# Patient Record
Sex: FEMALE | Race: OTHER | HISPANIC OR LATINO | URBAN - METROPOLITAN AREA
[De-identification: names, ages, dates, MRNs, and addresses within clinical notes are randomized per-mention and may not be internally consistent; named-entity substitution may affect disease eponyms.]

---

## 2016-06-09 RX ORDER — MECLIZINE HCL 12.5 MG
1 TABLET ORAL
Qty: 15 | Refills: 0 | OUTPATIENT
Start: 2016-06-09 | End: 2017-06-28

## 2017-06-23 ENCOUNTER — INPATIENT (INPATIENT)
Facility: HOSPITAL | Age: 67
LOS: 1 days | Discharge: HOME | DRG: 149 | End: 2017-06-25
Attending: FAMILY MEDICINE | Admitting: FAMILY MEDICINE
Payer: SELF-PAY

## 2017-06-23 VITALS
DIASTOLIC BLOOD PRESSURE: 91 MMHG | TEMPERATURE: 98 F | HEART RATE: 105 BPM | WEIGHT: 125 LBS | RESPIRATION RATE: 16 BRPM | HEIGHT: 59 IN | OXYGEN SATURATION: 95 % | SYSTOLIC BLOOD PRESSURE: 169 MMHG

## 2017-06-23 DIAGNOSIS — R42 DIZZINESS AND GIDDINESS: ICD-10-CM

## 2017-06-23 LAB
ALBUMIN SERPL ELPH-MCNC: 3.6 G/DL — SIGNIFICANT CHANGE UP (ref 3.5–5)
ALP SERPL-CCNC: 115 U/L — SIGNIFICANT CHANGE UP (ref 40–120)
ALT FLD-CCNC: 21 U/L DA — SIGNIFICANT CHANGE UP (ref 10–60)
ANION GAP SERPL CALC-SCNC: 7 MMOL/L — SIGNIFICANT CHANGE UP (ref 5–17)
AST SERPL-CCNC: 11 U/L — SIGNIFICANT CHANGE UP (ref 10–40)
BASOPHILS # BLD AUTO: 0.1 K/UL — SIGNIFICANT CHANGE UP (ref 0–0.2)
BASOPHILS NFR BLD AUTO: 1 % — SIGNIFICANT CHANGE UP (ref 0–2)
BILIRUB SERPL-MCNC: 0.4 MG/DL — SIGNIFICANT CHANGE UP (ref 0.2–1.2)
BUN SERPL-MCNC: 15 MG/DL — SIGNIFICANT CHANGE UP (ref 7–18)
CALCIUM SERPL-MCNC: 8.9 MG/DL — SIGNIFICANT CHANGE UP (ref 8.4–10.5)
CHLORIDE SERPL-SCNC: 106 MMOL/L — SIGNIFICANT CHANGE UP (ref 96–108)
CO2 SERPL-SCNC: 26 MMOL/L — SIGNIFICANT CHANGE UP (ref 22–31)
CREAT SERPL-MCNC: 0.96 MG/DL — SIGNIFICANT CHANGE UP (ref 0.5–1.3)
EOSINOPHIL # BLD AUTO: 0.1 K/UL — SIGNIFICANT CHANGE UP (ref 0–0.5)
EOSINOPHIL NFR BLD AUTO: 0.7 % — SIGNIFICANT CHANGE UP (ref 0–6)
GLUCOSE SERPL-MCNC: 192 MG/DL — HIGH (ref 70–99)
HCT VFR BLD CALC: 41.6 % — SIGNIFICANT CHANGE UP (ref 34.5–45)
HGB BLD-MCNC: 14.4 G/DL — SIGNIFICANT CHANGE UP (ref 11.5–15.5)
LYMPHOCYTES # BLD AUTO: 2.3 K/UL — SIGNIFICANT CHANGE UP (ref 1–3.3)
LYMPHOCYTES # BLD AUTO: 21.2 % — SIGNIFICANT CHANGE UP (ref 13–44)
MCHC RBC-ENTMCNC: 33.5 PG — SIGNIFICANT CHANGE UP (ref 27–34)
MCHC RBC-ENTMCNC: 34.5 GM/DL — SIGNIFICANT CHANGE UP (ref 32–36)
MCV RBC AUTO: 97.1 FL — SIGNIFICANT CHANGE UP (ref 80–100)
MONOCYTES # BLD AUTO: 0.6 K/UL — SIGNIFICANT CHANGE UP (ref 0–0.9)
MONOCYTES NFR BLD AUTO: 5.4 % — SIGNIFICANT CHANGE UP (ref 2–14)
NEUTROPHILS # BLD AUTO: 7.7 K/UL — HIGH (ref 1.8–7.4)
NEUTROPHILS NFR BLD AUTO: 71.6 % — SIGNIFICANT CHANGE UP (ref 43–77)
PLATELET # BLD AUTO: 277 K/UL — SIGNIFICANT CHANGE UP (ref 150–400)
POTASSIUM SERPL-MCNC: 3.7 MMOL/L — SIGNIFICANT CHANGE UP (ref 3.5–5.3)
POTASSIUM SERPL-SCNC: 3.7 MMOL/L — SIGNIFICANT CHANGE UP (ref 3.5–5.3)
PROT SERPL-MCNC: 8 G/DL — SIGNIFICANT CHANGE UP (ref 6–8.3)
RBC # BLD: 4.28 M/UL — SIGNIFICANT CHANGE UP (ref 3.8–5.2)
RBC # FLD: 11.3 % — SIGNIFICANT CHANGE UP (ref 10.3–14.5)
SODIUM SERPL-SCNC: 139 MMOL/L — SIGNIFICANT CHANGE UP (ref 135–145)
WBC # BLD: 10.8 K/UL — HIGH (ref 3.8–10.5)
WBC # FLD AUTO: 10.8 K/UL — HIGH (ref 3.8–10.5)

## 2017-06-23 PROCEDURE — 70450 CT HEAD/BRAIN W/O DYE: CPT | Mod: 26

## 2017-06-23 PROCEDURE — 99285 EMERGENCY DEPT VISIT HI MDM: CPT

## 2017-06-23 RX ORDER — MECLIZINE HCL 12.5 MG
25 TABLET ORAL ONCE
Qty: 0 | Refills: 0 | Status: COMPLETED | OUTPATIENT
Start: 2017-06-23 | End: 2017-06-23

## 2017-06-23 RX ORDER — IBUPROFEN 200 MG
400 TABLET ORAL ONCE
Qty: 0 | Refills: 0 | Status: COMPLETED | OUTPATIENT
Start: 2017-06-23 | End: 2017-06-23

## 2017-06-23 RX ADMIN — Medication 400 MILLIGRAM(S): at 22:32

## 2017-06-23 RX ADMIN — Medication 25 MILLIGRAM(S): at 15:56

## 2017-06-23 RX ADMIN — Medication 400 MILLIGRAM(S): at 18:49

## 2017-06-23 NOTE — ED PROVIDER NOTE - PROGRESS NOTE DETAILS
JUD: Pt was signed out to me pending CT and labs. Worry for cerebellar atrophy leading to worksening ataxia. Pt cannot ambulate. CT neg, labs urnemarkable. Will admit to Dr Roque, will need further workup inpt.

## 2017-06-23 NOTE — ED ADULT NURSE NOTE - OBJECTIVE STATEMENT
Presented to ED complaining of "dizziness for 2 weeks and headache." AA&Ox3. Breathing on room air. Denies LOC.

## 2017-06-23 NOTE — ED PROVIDER NOTE - OBJECTIVE STATEMENT
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  walks with cane at baseline, pt has been complaining of waxing and waning "dizziness'  now worse over past few weeks.  has an apt with neurologist this week  pt's daughter does not think pat can go home.  she is worried that the symptoms are getting worse

## 2017-06-23 NOTE — ED ADULT NURSE REASSESSMENT NOTE - NS ED NURSE REASSESS COMMENT FT1
received pt awake alert oriented x 3 not in distress,with saline lock intact no redness no swelling noted,waiting for dispo.

## 2017-06-24 DIAGNOSIS — Z29.9 ENCOUNTER FOR PROPHYLACTIC MEASURES, UNSPECIFIED: ICD-10-CM

## 2017-06-24 DIAGNOSIS — R42 DIZZINESS AND GIDDINESS: ICD-10-CM

## 2017-06-24 DIAGNOSIS — I10 ESSENTIAL (PRIMARY) HYPERTENSION: ICD-10-CM

## 2017-06-24 LAB
ALBUMIN SERPL ELPH-MCNC: 3.1 G/DL — LOW (ref 3.5–5)
ALP SERPL-CCNC: 99 U/L — SIGNIFICANT CHANGE UP (ref 40–120)
ALT FLD-CCNC: 19 U/L DA — SIGNIFICANT CHANGE UP (ref 10–60)
ANION GAP SERPL CALC-SCNC: 10 MMOL/L — SIGNIFICANT CHANGE UP (ref 5–17)
AST SERPL-CCNC: 11 U/L — SIGNIFICANT CHANGE UP (ref 10–40)
BASOPHILS # BLD AUTO: 0.1 K/UL — SIGNIFICANT CHANGE UP (ref 0–0.2)
BASOPHILS NFR BLD AUTO: 1.5 % — SIGNIFICANT CHANGE UP (ref 0–2)
BILIRUB SERPL-MCNC: 0.4 MG/DL — SIGNIFICANT CHANGE UP (ref 0.2–1.2)
BUN SERPL-MCNC: 14 MG/DL — SIGNIFICANT CHANGE UP (ref 7–18)
CALCIUM SERPL-MCNC: 8.3 MG/DL — LOW (ref 8.4–10.5)
CHLORIDE SERPL-SCNC: 107 MMOL/L — SIGNIFICANT CHANGE UP (ref 96–108)
CHOLEST SERPL-MCNC: 198 MG/DL — SIGNIFICANT CHANGE UP (ref 10–199)
CO2 SERPL-SCNC: 25 MMOL/L — SIGNIFICANT CHANGE UP (ref 22–31)
CREAT SERPL-MCNC: 0.79 MG/DL — SIGNIFICANT CHANGE UP (ref 0.5–1.3)
EOSINOPHIL # BLD AUTO: 0.2 K/UL — SIGNIFICANT CHANGE UP (ref 0–0.5)
EOSINOPHIL NFR BLD AUTO: 2.1 % — SIGNIFICANT CHANGE UP (ref 0–6)
GLUCOSE SERPL-MCNC: 95 MG/DL — SIGNIFICANT CHANGE UP (ref 70–99)
HBA1C BLD-MCNC: 5.8 % — HIGH (ref 4–5.6)
HCT VFR BLD CALC: 38.1 % — SIGNIFICANT CHANGE UP (ref 34.5–45)
HDLC SERPL-MCNC: 44 MG/DL — SIGNIFICANT CHANGE UP (ref 40–125)
HGB BLD-MCNC: 13.2 G/DL — SIGNIFICANT CHANGE UP (ref 11.5–15.5)
LIPID PNL WITH DIRECT LDL SERPL: 131 MG/DL — SIGNIFICANT CHANGE UP
LYMPHOCYTES # BLD AUTO: 2.2 K/UL — SIGNIFICANT CHANGE UP (ref 1–3.3)
LYMPHOCYTES # BLD AUTO: 27.4 % — SIGNIFICANT CHANGE UP (ref 13–44)
MAGNESIUM SERPL-MCNC: 2.6 MG/DL — SIGNIFICANT CHANGE UP (ref 1.6–2.6)
MCHC RBC-ENTMCNC: 33.4 PG — SIGNIFICANT CHANGE UP (ref 27–34)
MCHC RBC-ENTMCNC: 34.5 GM/DL — SIGNIFICANT CHANGE UP (ref 32–36)
MCV RBC AUTO: 96.9 FL — SIGNIFICANT CHANGE UP (ref 80–100)
MONOCYTES # BLD AUTO: 0.7 K/UL — SIGNIFICANT CHANGE UP (ref 0–0.9)
MONOCYTES NFR BLD AUTO: 8.3 % — SIGNIFICANT CHANGE UP (ref 2–14)
NEUTROPHILS # BLD AUTO: 4.8 K/UL — SIGNIFICANT CHANGE UP (ref 1.8–7.4)
NEUTROPHILS NFR BLD AUTO: 60.6 % — SIGNIFICANT CHANGE UP (ref 43–77)
PHOSPHATE SERPL-MCNC: 3.9 MG/DL — SIGNIFICANT CHANGE UP (ref 2.5–4.5)
PLATELET # BLD AUTO: 248 K/UL — SIGNIFICANT CHANGE UP (ref 150–400)
POTASSIUM SERPL-MCNC: 3.7 MMOL/L — SIGNIFICANT CHANGE UP (ref 3.5–5.3)
POTASSIUM SERPL-SCNC: 3.7 MMOL/L — SIGNIFICANT CHANGE UP (ref 3.5–5.3)
PROT SERPL-MCNC: 6.9 G/DL — SIGNIFICANT CHANGE UP (ref 6–8.3)
RBC # BLD: 3.93 M/UL — SIGNIFICANT CHANGE UP (ref 3.8–5.2)
RBC # FLD: 11.9 % — SIGNIFICANT CHANGE UP (ref 10.3–14.5)
SODIUM SERPL-SCNC: 142 MMOL/L — SIGNIFICANT CHANGE UP (ref 135–145)
TOTAL CHOLESTEROL/HDL RATIO MEASUREMENT: 4.5 RATIO — SIGNIFICANT CHANGE UP (ref 3.3–7.1)
TRIGL SERPL-MCNC: 113 MG/DL — SIGNIFICANT CHANGE UP (ref 10–149)
TSH SERPL-MCNC: 2.78 UU/ML — SIGNIFICANT CHANGE UP (ref 0.34–4.82)
WBC # BLD: 7.9 K/UL — SIGNIFICANT CHANGE UP (ref 3.8–10.5)
WBC # FLD AUTO: 7.9 K/UL — SIGNIFICANT CHANGE UP (ref 3.8–10.5)

## 2017-06-24 RX ORDER — MECLIZINE HCL 12.5 MG
25 TABLET ORAL THREE TIMES A DAY
Qty: 0 | Refills: 0 | Status: DISCONTINUED | OUTPATIENT
Start: 2017-06-24 | End: 2017-06-25

## 2017-06-24 RX ORDER — AMLODIPINE BESYLATE 2.5 MG/1
5 TABLET ORAL DAILY
Qty: 0 | Refills: 0 | Status: DISCONTINUED | OUTPATIENT
Start: 2017-06-24 | End: 2017-06-25

## 2017-06-24 RX ORDER — ASPIRIN/CALCIUM CARB/MAGNESIUM 324 MG
81 TABLET ORAL DAILY
Qty: 0 | Refills: 0 | Status: DISCONTINUED | OUTPATIENT
Start: 2017-06-24 | End: 2017-06-25

## 2017-06-24 RX ORDER — SODIUM CHLORIDE 9 MG/ML
500 INJECTION INTRAMUSCULAR; INTRAVENOUS; SUBCUTANEOUS ONCE
Qty: 0 | Refills: 0 | Status: COMPLETED | OUTPATIENT
Start: 2017-06-24 | End: 2017-06-24

## 2017-06-24 RX ORDER — SODIUM CHLORIDE 9 MG/ML
1000 INJECTION INTRAMUSCULAR; INTRAVENOUS; SUBCUTANEOUS
Qty: 0 | Refills: 0 | Status: DISCONTINUED | OUTPATIENT
Start: 2017-06-24 | End: 2017-06-25

## 2017-06-24 RX ORDER — ONDANSETRON 8 MG/1
4 TABLET, FILM COATED ORAL EVERY 8 HOURS
Qty: 0 | Refills: 0 | Status: DISCONTINUED | OUTPATIENT
Start: 2017-06-24 | End: 2017-06-25

## 2017-06-24 RX ORDER — ATORVASTATIN CALCIUM 80 MG/1
40 TABLET, FILM COATED ORAL AT BEDTIME
Qty: 0 | Refills: 0 | Status: DISCONTINUED | OUTPATIENT
Start: 2017-06-24 | End: 2017-06-25

## 2017-06-24 RX ADMIN — Medication 25 MILLIGRAM(S): at 21:46

## 2017-06-24 RX ADMIN — AMLODIPINE BESYLATE 5 MILLIGRAM(S): 2.5 TABLET ORAL at 05:07

## 2017-06-24 RX ADMIN — SODIUM CHLORIDE 1000 MILLILITER(S): 9 INJECTION INTRAMUSCULAR; INTRAVENOUS; SUBCUTANEOUS at 19:39

## 2017-06-24 RX ADMIN — Medication 25 MILLIGRAM(S): at 05:07

## 2017-06-24 RX ADMIN — ATORVASTATIN CALCIUM 40 MILLIGRAM(S): 80 TABLET, FILM COATED ORAL at 21:46

## 2017-06-24 RX ADMIN — Medication 25 MILLIGRAM(S): at 14:27

## 2017-06-24 NOTE — H&P ADULT - PROBLEM SELECTOR PLAN 1
-  patient with history of vertigo likely secondary to cerebellar atrophy due to radiation for breast ca with chronic gait instability  - Was in ed last year for similar symptoms  - EKG shows NSR.  - Started on meclizine  - Will give ativan if symptoms do not improve with meclizine  -  Orthostatic's negative  - Neurology follow up outpatient

## 2017-06-24 NOTE — H&P ADULT - ATTENDING COMMENTS
patient seen and examined. case fully discussed with  ER attending and medical resident. reviewed chief complaint. reviewed review of systems. reviewed list of medication,  reviewed physical exam. reviewed assessment and plan. agree with full H and P. will follow up clinically and follow up with neurology. fall precautions.

## 2017-06-24 NOTE — H&P ADULT - NEGATIVE CARDIOVASCULAR SYMPTOMS
no peripheral edema/no chest pain/no orthopnea/no paroxysmal nocturnal dyspnea/no palpitations/no dyspnea on exertion

## 2017-06-24 NOTE — H&P ADULT - ASSESSMENT
The patient is a 66y Moh[place] speaking Female from home with hx of cerebellar atrophy due to radiation for breast ca with chronic gait instability,HTN presents with worsening dizziness since the past three days. In the ed vitals are Stable. Ct head negative for acute findings.Admitted for vertigo

## 2017-06-24 NOTE — H&P ADULT - HISTORY OF PRESENT ILLNESS
The patient is a 66y Japanese speaking Female from home with hx of cerebellar atrophy due to radiation for breast ca with chronic gait instability,HTN presents with worsening dizziness since the past three days. As per patient she had radiation for breast cancer more than 10 years ago and has had ataxia from the cerebellar atrophy in since. However since the past three days she has been feeling increasingly dizzy associated with some nausea but no vomiting , ringing in ears, falls or loss of consciousness. She says that the dizziness is better when she keeps her head steady looking straight but is exacerbated when she turns her head to either side. Dizziness is also associated with frequent blinking but no blurring or vision loss. She had three episodes of diarrhea yesterday but they have resolved now. She has an appointment with her neurologist this week. She did not have any recent infections and has no URI symptoms. Her only other medical condition is high blood pressure and she has no history of alcohol abuse or smoking.  No chest pain, shortness of breath.No fevers , chills or dysuria.No cough or plueritic chest pain. no vomiting or abdominal pain .Family h/o not significant for stroke/MI or malignancy . Patient lives with  ambulates with cane at baseline .  History was obtained from patient and  who speaks some English but primarily speak Japanese using  phone (interpretor [id number])

## 2017-06-24 NOTE — CHART NOTE - NSCHARTNOTEFT_GEN_A_CORE
PGY1 paged that patient is having black spots in vision, saw and examined patient, reports that for 2 days has had intermittent episodes of seeing black spots, is not a new findings, on neuro exam, no focal deficits, loss of sensation or diminished strength or ROM; vitals and FS rechecked, likely 2/2 hx cerebellar atrophy, head CT this AM was negative. PGY1 paged that patient is having black spots in vision, saw and examined patient, reports that for 2 days has had intermittent episodes of seeing black spots, is not a new findings, on neuro exam, no focal deficits, loss of sensation or diminished strength or ROM; vitals and FS rechecked, likely 2/2 hx cerebellar atrophy, head CT this AM was negative. Carotid dopplers were ordered to r/o stenosis. PGY1 paged that patient is having black spots in vision, saw and examined patient, reports that for 2 days has had intermittent episodes of seeing black spots, is not a new findings, on neuro exam, no focal deficits, loss of sensation or diminished strength or ROM; vitals and FS rechecked, is +orthostatics, 500cc bolus given, already on IV fluids, likely 2/2 hx cerebellar atrophy as well, head CT this AM was negative. Carotid dopplers were ordered to r/o stenosis.

## 2017-06-24 NOTE — H&P ADULT - NEGATIVE NEUROLOGICAL SYMPTOMS
no transient paralysis/no weakness/no paresthesias/no syncope/no generalized seizures/no focal seizures

## 2017-06-24 NOTE — H&P ADULT - MUSCULOSKELETAL
negative detailed exam no joint swelling/normal/no calf tenderness/no joint erythema/no joint warmth/normal strength

## 2017-06-24 NOTE — H&P ADULT - RS GEN PE MLT RESP DETAILS PC
clear to auscultation bilaterally/no intercostal retractions/normal/no wheezes/breath sounds equal/no rales/good air movement/respirations non-labored/airway patent/no rhonchi

## 2017-06-25 VITALS
DIASTOLIC BLOOD PRESSURE: 83 MMHG | OXYGEN SATURATION: 98 % | SYSTOLIC BLOOD PRESSURE: 144 MMHG | HEART RATE: 82 BPM | RESPIRATION RATE: 17 BRPM | TEMPERATURE: 98 F

## 2017-06-25 PROCEDURE — 80061 LIPID PANEL: CPT

## 2017-06-25 PROCEDURE — 83735 ASSAY OF MAGNESIUM: CPT

## 2017-06-25 PROCEDURE — 70450 CT HEAD/BRAIN W/O DYE: CPT

## 2017-06-25 PROCEDURE — 84100 ASSAY OF PHOSPHORUS: CPT

## 2017-06-25 PROCEDURE — 36415 COLL VENOUS BLD VENIPUNCTURE: CPT

## 2017-06-25 PROCEDURE — 85027 COMPLETE CBC AUTOMATED: CPT

## 2017-06-25 PROCEDURE — 83036 HEMOGLOBIN GLYCOSYLATED A1C: CPT

## 2017-06-25 PROCEDURE — 80053 COMPREHEN METABOLIC PANEL: CPT

## 2017-06-25 PROCEDURE — 84443 ASSAY THYROID STIM HORMONE: CPT

## 2017-06-25 PROCEDURE — 93005 ELECTROCARDIOGRAM TRACING: CPT

## 2017-06-25 PROCEDURE — 99285 EMERGENCY DEPT VISIT HI MDM: CPT | Mod: 25

## 2017-06-25 RX ORDER — AMLODIPINE BESYLATE 2.5 MG/1
1 TABLET ORAL
Qty: 0 | Refills: 0 | COMMUNITY

## 2017-06-25 RX ORDER — MECLIZINE HCL 12.5 MG
1 TABLET ORAL
Qty: 30 | Refills: 0 | OUTPATIENT
Start: 2017-06-25

## 2017-06-25 RX ADMIN — SODIUM CHLORIDE 100 MILLILITER(S): 9 INJECTION INTRAMUSCULAR; INTRAVENOUS; SUBCUTANEOUS at 12:12

## 2017-06-25 RX ADMIN — Medication 25 MILLIGRAM(S): at 14:56

## 2017-06-25 RX ADMIN — Medication 81 MILLIGRAM(S): at 12:12

## 2017-06-25 RX ADMIN — AMLODIPINE BESYLATE 5 MILLIGRAM(S): 2.5 TABLET ORAL at 05:56

## 2017-06-25 RX ADMIN — Medication 25 MILLIGRAM(S): at 05:56

## 2017-06-25 NOTE — PROGRESS NOTE ADULT - SUBJECTIVE AND OBJECTIVE BOX
CHIEF COMPLAINT:Patient is a 66y old  Female who presents with a chief complaint of vertigo (24 Jun 2017 01:41)    	  REVIEW OF SYSTEMS:  CONSTITUTIONAL: No fever, weight loss, or fatigue  EYES: No eye pain, visual disturbances, or discharge  ENMT:  No difficulty hearing, tinnitus, vertigo; No sinus or throat pain  NECK: No pain or stiffness  RESPIRATORY: No cough, wheezing, chills or hemoptysis; No Shortness of Breath  CARDIOVASCULAR: No chest pain, palpitations, passing out, dizziness, or leg swelling  GASTROINTESTINAL: No abdominal or epigastric pain. No nausea, vomiting, or hematemesis; No diarrhea or constipation. No melena or hematochezia.  GENITOURINARY: No dysuria, frequency, hematuria, or incontinence  NEUROLOGICAL: No headaches, memory loss, loss of strength, numbness, or tremors  SKIN: No itching, burning, rashes, or lesions   LYMPH Nodes: No enlarged glands  ENDOCRINE: No heat or cold intolerance; No hair loss  MUSCULOSKELETAL: No joint pain or swelling; No muscle, back, or extremity pain  PSYCHIATRIC: No depression, anxiety, mood swings, or difficulty sleeping  HEME/LYMPH: No easy bruising, or bleeding gums  ALLERY AND IMMUNOLOGIC: No hives or eczema	    [ ] All others negative	  [ ] Unable to obtain    PHYSICAL EXAM:  T(C): 36.9, Max: 37.1 (06-24 @ 20:45)  HR: 100 (67 - 104)  BP: 150/86 (125/78 - 150/86)  RR: 17 (16 - 17)  SpO2: 97% (96% - 98%)  Wt(kg): --  I&O's Summary      Appearance: Normal	  HEENT:   Normal oral mucosa, PERRL, EOMI	  Lymphatic: No lymphadenopathy  Cardiovascular: Normal S1 S2, No JVD, No murmurs, No edema  Respiratory: Lungs clear to auscultation	  Psychiatry: A & O x 3, Mood & affect appropriate  Gastrointestinal:  Soft, Non-tender, + BS	  Skin: No rashes, No ecchymoses, No cyanosis	  Neurologic: Non-focal  Extremities: Normal range of motion, No clubbing, cyanosis or edema  Vascular: Peripheral pulses palpable 2+ bilaterally    MEDICATIONS  (STANDING):  meclizine 25milliGRAM(s) Oral three times a day  amLODIPine   Tablet 5milliGRAM(s) Oral daily  sodium chloride 0.9%. 1000milliLiter(s) IV Continuous <Continuous>  aspirin  chewable 81milliGRAM(s) Oral daily  atorvastatin 40milliGRAM(s) Oral at bedtime      TELEMETRY: 	    ECG:  	  RADIOLOGY:  OTHER: 	  	  CBC Full  -  ( 24 Jun 2017 06:43 )  WBC Count : 7.9 K/uL  Hemoglobin : 13.2 g/dL  Hematocrit : 38.1 %  Platelet Count - Automated : 248 K/uL  Mean Cell Volume : 96.9 fl  Mean Cell Hemoglobin : 33.4 pg  Mean Cell Hemoglobin Concentration : 34.5 gm/dL  Auto Neutrophil # : 4.8 K/uL  Auto Lymphocyte # : 2.2 K/uL  Auto Monocyte # : 0.7 K/uL  Auto Eosinophil # : 0.2 K/uL  Auto Basophil # : 0.1 K/uL  Auto Neutrophil % : 60.6 %  Auto Lymphocyte % : 27.4 %  Auto Monocyte % : 8.3 %  Auto Eosinophil % : 2.1 %  Auto Basophil % : 1.5 %      06-24    142  |  107  |  14  ----------------------------<  95  3.7   |  25  |  0.79    Ca    8.3<L>      24 Jun 2017 06:43  Phos  3.9     06-24  Mg     2.6     06-24    TPro  6.9  /  Alb  3.1<L>  /  TBili  0.4  /  DBili  x   /  AST  11  /  ALT  19  /  AlkPhos  99  06-24      CARDIAC MARKERS:                                13.2   7.9   )-----------( 248      ( 24 Jun 2017 06:43 )             38.1       06-24    142  |  107  |  14  ----------------------------<  95  3.7   |  25  |  0.79    Ca    8.3<L>      24 Jun 2017 06:43  Phos  3.9     06-24  Mg     2.6     06-24    TPro  6.9  /  Alb  3.1<L>  /  TBili  0.4  /  DBili  x   /  AST  11  /  ALT  19  /  AlkPhos  99  06-24            proBNP:   Lipid Profile: Cholesterol 198    HDL 44      HgA1c: Hemoglobin A1C, Whole Blood: 5.8 % (06-24 @ 10:15)    TSH: Thyroid Stimulating Hormone, Serum: 2.78 uU/mL (06-24 @ 06:43)

## 2017-06-25 NOTE — DISCHARGE NOTE ADULT - CARE PLAN
Principal Discharge DX:	Vertigo  Goal:	symptomatic relief  Instructions for follow-up, activity and diet:	CT scan of the head showed no acute process.  Meclizine helped relieve your symptoms.  Take meclizine 25mg as needed for vertigo, up to 3 times per day (every eight hours as needed for vertigo).  Make an appointment to see your primary doctor and primary neurologist within 1 week after discharge or sooner.  Secondary Diagnosis:	HTN (hypertension)  Goal:	Bp under 140/90  Instructions for follow-up, activity and diet:	Continue norvasc as you were taking prior to coming to the hospital.

## 2017-06-25 NOTE — PROGRESS NOTE ADULT - ASSESSMENT
patient is seen and examined. Daughter in the room, spoke with her in detail as well. patient feels very well with no dizziness for the past two days since admission. daughter states mother has had similar episodes of dizziness for the past two years and was seen by neurology, Dr Sarmiento in the office and an MRI was done about one year age for similar symptoms and was negative. now follows up with PMD and a new neurologist. wants to go home and will see both of them this week. Copies of all blood work and CT scan given to patient. will continue antivert as needed as out patient. case fully discussed with the resident.

## 2017-06-25 NOTE — DISCHARGE NOTE ADULT - MEDICATION SUMMARY - MEDICATIONS TO TAKE
I will START or STAY ON the medications listed below when I get home from the hospital:    meclizine 25 mg oral tablet  -- 1 tab(s) by mouth 3 times a day, As Needed for vertigo  -- Indication: For Vertigo    Norvasc 5 mg oral tablet  -- 1 tab(s) by mouth once a day  -- Indication: For Hypertension

## 2017-06-25 NOTE — PHYSICAL THERAPY INITIAL EVALUATION ADULT - DIAGNOSIS, PT EVAL
Pt presents with decreased overall muscle strength.  Unable to assess transfers/amb at this time secondary to increase HR

## 2017-06-25 NOTE — DISCHARGE NOTE ADULT - HOSPITAL COURSE
HPI:   The patient is a 66y Burkinan speaking Female from home with hx of cerebellar atrophy due to radiation for breast ca with chronic gait instability,HTN presents with worsening dizziness for three days. As per patient she had radiation for breast cancer more than 10 years ago and has had ataxia from the cerebellar atrophy in since. However since the past three days she has been feeling increasingly dizzy associated with some nausea but no vomiting , ringing in ears, falls or loss of consciousness. She says that the dizziness is better when she keeps her head steady looking straight but is exacerbated when she turns her head to either side. Dizziness is also associated with frequent blinking but no blurring or vision loss. She had three episodes of diarrhea yesterday but they have resolved now. She has an appointment with her neurologist this week. She did not have any recent infections and has no URI symptoms. Her only other medical condition is high blood pressure and she has no history of alcohol abuse or smoking.  No chest pain, shortness of breath.No fevers , chills or dysuria.No cough or pleuritic chest pain. no vomiting or abdominal pain .Family h/o not significant for stroke/MI or malignancy . Patient lives with  ambulates with cane at baseline .  History was obtained from patient and  who speaks some English but primarily speak Burkinan using  phone (interpretor [id number])     Hospital Course:  In the ed vitals are Stable. Ct head negative for acute findings.Admitted for vertigo workup as patient is having trouble walking/gait instability. Had similar symptoms and negative MRI in the past. EKG showing NSR. Meclizine started, patient notes adequate relief on meclizine 25mg TID prn vertigo. Orthostatics negative. Per attending, Dr. Roque, patient is cleared for discharge on PRN meclizine. Patient is instructed to follow up with her primary doctor and neurologist within the next 1-7 days after discharge.

## 2017-06-25 NOTE — DISCHARGE NOTE ADULT - PLAN OF CARE
Bp under 140/90 Continue norvasc as you were taking prior to coming to the hospital. symptomatic relief CT scan of the head showed no acute process.  Meclizine helped relieve your symptoms.  Take meclizine 25mg as needed for vertigo, up to 3 times per day (every eight hours as needed for vertigo).  Make an appointment to see your primary doctor and primary neurologist within 1 week after discharge or sooner.

## 2017-06-27 DIAGNOSIS — R42 DIZZINESS AND GIDDINESS: ICD-10-CM

## 2017-06-27 DIAGNOSIS — R26.0 ATAXIC GAIT: ICD-10-CM

## 2017-06-27 DIAGNOSIS — I10 ESSENTIAL (PRIMARY) HYPERTENSION: ICD-10-CM

## 2017-06-27 DIAGNOSIS — Z92.21 PERSONAL HISTORY OF ANTINEOPLASTIC CHEMOTHERAPY: ICD-10-CM

## 2017-06-27 DIAGNOSIS — R26.81 UNSTEADINESS ON FEET: ICD-10-CM

## 2017-06-27 DIAGNOSIS — Z85.3 PERSONAL HISTORY OF MALIGNANT NEOPLASM OF BREAST: ICD-10-CM

## 2017-07-19 NOTE — DISCHARGE NOTE ADULT - PATIENT PORTAL LINK FT
Health Maintenance Summary     Topic Due On Due Status Completed On    Immunization-Zoster Dec 26, 1990 Overdue     Immunization - Pneumococcal Feb 28, 1997 Overdue Feb 29, 1996    Medicare Wellness Visit Jul 18, 2017 Due On Jul 18, 2016    IMMUNIZATION - DTaP/Tdap/Td Oct 5, 2025 Not Due Oct 5, 2015    Immunization-Influenza Sep 1, 2017 Not Due           Patient refuses pneumonia immunization stating she has had bacterial pneumonia in the past and does not wish to be vaccinated.      “You can access the [facility] Patient Portal, offered by [facility], by registering with the following website: [email or web address]”

## 2022-04-08 NOTE — ED ADULT TRIAGE NOTE - DIRECT TO ROOM CARE INITIATED:
· Patient gait notably ataxic on admission  · Finger-to-nose ataxia appreciated as well  · Likely secondary to severe alcohol withdrawal versus Wernicke's  · Initiate high-dose thiamine  · Head CT ordered however patient uncooperative with procedure  · Consider brain MRI once patient more cooperative  · Fall precautions  · 1:1 continues observation for safety 23-Jun-2017 13:58

## 2023-04-26 NOTE — DISCHARGE NOTE ADULT - IF YOU ARE A SMOKER, IT IS IMPORTANT FOR YOUR HEALTH TO STOP SMOKING. PLEASE BE AWARE THAT SECOND HAND SMOKE IS ALSO HARMFUL.
PATIENT INSTRUCTIONS    Thank you for seeing me today, it was a pleasure taking care of you. Please check out at the  and schedule a follow up appointment. Return in about 3 months (around 7/26/2023) for follow up.   EMG - nerve test   When you sleep at night, wear a wrist splints  Please sleep in a straight position  Physical therapy   If you want to try gabapentin you can let me or Dr Melvin Fairchild know  Stay well hydrated, warm fluids, honey, lozenges, tylenol as needed  Discuss with Dr Melvin Fairchild regarding Hebert Peck,  Dr. Karen Rand
Statement Selected
all other ROS negative except as per HPI